# Patient Record
(demographics unavailable — no encounter records)

---

## 2025-03-07 NOTE — ASSESSMENT
[FreeTextEntry1] : The patient is a 59-year-old female with PMHx of HTN, HLD, prediabetic and herniated disc who presents for follow-up.   #Stroke --Again, advised to get us records/images sent for review from [facility] (phone number provided) --Discussed transition to ASA (again, patient would like to review studies before changing) --Unable to tolerate CPAP, recommend to follow-up with sleep specialist for alternative treatments. --Continue statin, advised to remain compliant, especially with recent overeating, if LDL remains high, will need to increase statin. Keep dose at this time, since patient non-compliant with it. --Encouraged health diet/lifestyle with regular exercise  #Frequent headaches --Educated on Nortriptyline 10mg daily to treat headache and not a PRN sleep medication. Advised to take it daily and will check in 2 weeks. --Advised f/u with Dr. Benoit for DOT management as can be contributing to her headaches  --Educated on analgesic overuse headaches and encouraged use of OTC pain meds 3 times per week  RTC 6 months

## 2025-03-07 NOTE — HISTORY OF PRESENT ILLNESS
[FreeTextEntry1] : The patient is a 59-year-old female with PMHx of HTN, HLD, prediabetic and herniated disc who presents for follow-up for possible stroke vs  on outpatient MRI performed in [place]  Patient was last seen by Dr. Gallegos 2024.   Since last visit, she denies new stroke like symptoms. She remains on the Rosuvastatin and Plavix. She has not been able to send records from [facility].  She tried a CPAP machine for 3 days last year, but she was unable to tolerate it. She was recently in [place] for five weeks, she reports she gained some weight. She also was non-compliant with her statin; she was afraid it can mess up with her liver. Her BP today is 122/84. She did her labs recently with her pcp and will send us a copy.  For her headaches, Nortriptyline 10mg was recommended, she was taking it as needed she thought it was for sleep. She continues to suffer from daily headaches but recently the past week she felt a bump in her left occipital region which was very painful and sore to touch. She was taking Ibuprofen with some relief   Current meds: Clopidogrel 75mg, Rosuvastatin 20mg, Nortriptyline 10mg (using it prn)

## 2025-03-07 NOTE — PHYSICAL EXAM
[FreeTextEntry1] : Alert. Fully oriented. Speech and language are intact. Cranial nerves II-XII are intact. Motor exam reveals intact strength with individual muscle testing in bilateral upper and lower extremities. Tone is normal. Reflexes are normal. Sensation is intact to light touch in distal extremities. Finger-to-nose and heel-to-shin are intact. Rapid alternating movements are normal in the upper and lower extremities. Gait is normal.